# Patient Record
Sex: MALE | Race: WHITE | NOT HISPANIC OR LATINO | Employment: FULL TIME | URBAN - METROPOLITAN AREA
[De-identification: names, ages, dates, MRNs, and addresses within clinical notes are randomized per-mention and may not be internally consistent; named-entity substitution may affect disease eponyms.]

---

## 2023-03-28 ENCOUNTER — TELEPHONE (OUTPATIENT)
Dept: SURGERY | Facility: CLINIC | Age: 38
End: 2023-03-28

## 2023-03-28 NOTE — TELEPHONE ENCOUNTER
[facility]  Ref # [id number]  Provider npi network  Ins active as of 11/29/2022 no future term date  No ref req  facility npi in [facility]

## 2023-03-31 ENCOUNTER — OFFICE VISIT (OUTPATIENT)
Dept: LAB | Facility: HOSPITAL | Age: 38
End: 2023-03-31

## 2023-03-31 ENCOUNTER — APPOINTMENT (OUTPATIENT)
Dept: LAB | Facility: HOSPITAL | Age: 38
End: 2023-03-31

## 2023-03-31 ENCOUNTER — OFFICE VISIT (OUTPATIENT)
Dept: SURGERY | Facility: CLINIC | Age: 38
End: 2023-03-31

## 2023-03-31 VITALS
TEMPERATURE: 97.8 F | RESPIRATION RATE: 16 BRPM | OXYGEN SATURATION: 97 % | HEART RATE: 67 BPM | DIASTOLIC BLOOD PRESSURE: 70 MMHG | SYSTOLIC BLOOD PRESSURE: 122 MMHG | BODY MASS INDEX: 19.67 KG/M2 | HEIGHT: 69 IN | WEIGHT: 132.8 LBS

## 2023-03-31 DIAGNOSIS — K40.90 LEFT INGUINAL HERNIA: ICD-10-CM

## 2023-03-31 DIAGNOSIS — K40.90 LEFT INGUINAL HERNIA: Primary | ICD-10-CM

## 2023-03-31 LAB
ANION GAP SERPL CALCULATED.3IONS-SCNC: 5 MMOL/L (ref 4–13)
ATRIAL RATE: 55 BPM
ATRIAL RATE: 59 BPM
BASOPHILS # BLD AUTO: 0.03 THOUSANDS/ÂΜL (ref 0–0.1)
BASOPHILS NFR BLD AUTO: 0 % (ref 0–1)
BUN SERPL-MCNC: 13 MG/DL (ref 5–25)
CALCIUM SERPL-MCNC: 8.3 MG/DL (ref 8.4–10.2)
CHLORIDE SERPL-SCNC: 107 MMOL/L (ref 96–108)
CO2 SERPL-SCNC: 27 MMOL/L (ref 21–32)
CREAT SERPL-MCNC: 0.93 MG/DL (ref 0.6–1.3)
EOSINOPHIL # BLD AUTO: 0.2 THOUSAND/ÂΜL (ref 0–0.61)
EOSINOPHIL NFR BLD AUTO: 3 % (ref 0–6)
ERYTHROCYTE [DISTWIDTH] IN BLOOD BY AUTOMATED COUNT: 12.4 % (ref 11.6–15.1)
GFR SERPL CREATININE-BSD FRML MDRD: 103 ML/MIN/1.73SQ M
GLUCOSE P FAST SERPL-MCNC: 92 MG/DL (ref 65–99)
HCT VFR BLD AUTO: 39 % (ref 36.5–49.3)
HGB BLD-MCNC: 13.2 G/DL (ref 12–17)
IMM GRANULOCYTES # BLD AUTO: 0.02 THOUSAND/UL (ref 0–0.2)
IMM GRANULOCYTES NFR BLD AUTO: 0 % (ref 0–2)
LYMPHOCYTES # BLD AUTO: 1.61 THOUSANDS/ÂΜL (ref 0.6–4.47)
LYMPHOCYTES NFR BLD AUTO: 23 % (ref 14–44)
MCH RBC QN AUTO: 30.7 PG (ref 26.8–34.3)
MCHC RBC AUTO-ENTMCNC: 33.8 G/DL (ref 31.4–37.4)
MCV RBC AUTO: 91 FL (ref 82–98)
MONOCYTES # BLD AUTO: 0.55 THOUSAND/ÂΜL (ref 0.17–1.22)
MONOCYTES NFR BLD AUTO: 8 % (ref 4–12)
NEUTROPHILS # BLD AUTO: 4.57 THOUSANDS/ÂΜL (ref 1.85–7.62)
NEUTS SEG NFR BLD AUTO: 66 % (ref 43–75)
NRBC BLD AUTO-RTO: 0 /100 WBCS
P AXIS: 59 DEGREES
P AXIS: 63 DEGREES
PLATELET # BLD AUTO: 230 THOUSANDS/UL (ref 149–390)
PMV BLD AUTO: 9.6 FL (ref 8.9–12.7)
POTASSIUM SERPL-SCNC: 3.9 MMOL/L (ref 3.5–5.3)
PR INTERVAL: 192 MS
PR INTERVAL: 196 MS
QRS AXIS: 58 DEGREES
QRS AXIS: 63 DEGREES
QRSD INTERVAL: 96 MS
QRSD INTERVAL: 98 MS
QT INTERVAL: 420 MS
QT INTERVAL: 422 MS
QTC INTERVAL: 401 MS
QTC INTERVAL: 417 MS
RBC # BLD AUTO: 4.3 MILLION/UL (ref 3.88–5.62)
SODIUM SERPL-SCNC: 139 MMOL/L (ref 135–147)
T WAVE AXIS: 59 DEGREES
T WAVE AXIS: 64 DEGREES
VENTRICULAR RATE: 55 BPM
VENTRICULAR RATE: 59 BPM
WBC # BLD AUTO: 6.98 THOUSAND/UL (ref 4.31–10.16)

## 2023-03-31 RX ORDER — BUPRENORPHINE AND NALOXONE 8; 2 MG/1; MG/1
FILM, SOLUBLE BUCCAL; SUBLINGUAL DAILY
COMMUNITY

## 2023-03-31 RX ORDER — CHLORHEXIDINE GLUCONATE 4 G/100ML
SOLUTION TOPICAL DAILY PRN
OUTPATIENT
Start: 2023-03-31

## 2023-03-31 NOTE — PROGRESS NOTES
Assessment/Plan:  78-year-old male with reducible left inguinal hernia  -Patient presents due to left groin pain and bulge  -Has had a left inguinal hernia for over 10 years and is slowly gotten larger  -Causes him pain with straining and when the hernia is very distended  -States he is able to push the hernia back in  -On exam there is a large reducible left inguinal hernia most likely containing bowel, no obvious inguinal hernia on the right  -BMP and LFTs from 3/30/2023 reviewed, this is in [facility]  -Hemoglobin A1c from 3/30/2023 reviewed, noted to be 5 6, this is in [facility]  -Primary care physician note from 3/24/2023 reviewed, this is in [facility]  -Will plan for laparoscopic left inguinal hernia repair with mesh, possible open, possible bilateral  -CBC ordered  -BMP ordered, will repeat this as last potassium level was noted to be 5 5  -EKG ordered    A visual was used to display the anatomy and the proposed incision, procedure, steps, and mesh placement  The risks were explained at length and outlined, not limited to bleeding, infection, recurrence, pain, testicular loss, and damage to other structures  The planned approximately one hour procedure was discussed along with 1 - 2 week recovery, resolution of pain and swelling timeline, and 4 weeks of light duty without lifting  Questions were answered to satisfaction and consent was signed  1  Left inguinal hernia  -     Case request operating room: REPAIR HERNIA INGUINAL, LAPAROSCOPIC, Possible Open, Possible Bilateral; Standing  -     CBC and differential; Future  -     Basic metabolic panel; Future  -     EKG 12 lead; Future  -     Case request operating room: REPAIR HERNIA INGUINAL, LAPAROSCOPIC, Possible Open, Possible Bilateral               Subjective:      Patient ID: Bharath Bowers is a 44 y o  male  Triage Notes:    Patient is a 78-year-old male who presents the office for evaluation of left groin pain and bulge    Patient states he has a longstanding history of left inguinal hernia  It started out small but over the years has gotten larger and it is causing him discomfort  States that when he strains he has pain in his left groin  Describes the pain as a pressure or tearing  He is able to push the hernia and most times  He is passing flatus and having normal bowel function  The following portions of the patient's history were reviewed and updated as appropriate:   He  has no past medical history on file  He   Patient Active Problem List    Diagnosis Date Noted   • Left inguinal hernia 03/31/2023     He  has no past surgical history on file  His family history includes Cancer in his mother; Heart disease in his father; Stroke in his father  He  reports that he has quit smoking  His smoking use included cigarettes  He smoked an average of 1 pack per day  He has never been exposed to tobacco smoke  He has never used smokeless tobacco  He reports current alcohol use  He reports current drug use  Drug: Marijuana  Current Outpatient Medications on File Prior to Visit   Medication Sig   • buprenorphine-naloxone (Suboxone) 8-2 mg Place under the tongue daily     No current facility-administered medications on file prior to visit  He has No Known Allergies       Review of Systems   Constitutional: Negative for chills, fatigue and fever  HENT: Negative for congestion, hearing loss, rhinorrhea and sore throat  Eyes: Negative for pain and discharge  Respiratory: Negative for cough, chest tightness and shortness of breath  Cardiovascular: Negative for chest pain and palpitations  Gastrointestinal: Negative for abdominal pain, constipation, diarrhea, nausea and vomiting  Positive left groin pain and bulge   Endocrine: Negative for cold intolerance and heat intolerance  Genitourinary: Negative for difficulty urinating and dysuria  Musculoskeletal: Negative for back pain and neck pain     Skin: Negative for "color change and rash  Allergic/Immunologic: Negative for environmental allergies and food allergies  Neurological: Negative for seizures and headaches  Hematological: Negative for adenopathy  Does not bruise/bleed easily  Psychiatric/Behavioral: Negative for confusion and hallucinations  Objective:      /70 (BP Location: Left arm, Patient Position: Sitting, Cuff Size: Standard)   Pulse 67   Temp 97 8 °F (36 6 °C) (Temporal)   Resp 16   Ht 5' 9\" (1 753 m)   Wt 60 2 kg (132 lb 12 8 oz)   SpO2 97%   BMI 19 61 kg/m²     Below is the patient's most recent value for Albumin, ALT, AST, BUN, Calcium, Chloride, Cholesterol, CO2, Creatinine, GFR, Glucose, HDL, Hematocrit, Hemoglobin, Hemoglobin A1C, LDL, Magnesium, Phosphorus, Platelets, Potassium, PSA, Sodium, Triglycerides, and WBC  Lab Results   Component Value Date    HCT 39 0 03/31/2023    HGB 13 2 03/31/2023    HGBA1C 5 6 03/30/2023     03/31/2023    WBC 6 98 03/31/2023     Note: for a comprehensive list of the patient's lab results, access the Results Review activity  Physical Exam  Constitutional:       Appearance: Normal appearance  HENT:      Head: Normocephalic and atraumatic  Nose: Nose normal    Eyes:      General: No scleral icterus  Conjunctiva/sclera: Conjunctivae normal    Cardiovascular:      Rate and Rhythm: Normal rate and regular rhythm  Heart sounds: Normal heart sounds  Pulmonary:      Effort: Pulmonary effort is normal       Breath sounds: Normal breath sounds  Abdominal:      General: There is no distension  Palpations: Abdomen is soft  Tenderness: There is no abdominal tenderness  Comments: There is a large reducible left inguinal hernia, no obvious inguinal hernia on the right   Musculoskeletal:         General: No signs of injury  Skin:     General: Skin is warm  Coloration: Skin is not jaundiced  Neurological:      General: No focal deficit present        Mental " Status: He is alert and oriented to person, place, and time     Psychiatric:         Mood and Affect: Mood normal          Behavior: Behavior normal

## 2023-04-27 ENCOUNTER — OFFICE VISIT (OUTPATIENT)
Dept: SURGERY | Facility: CLINIC | Age: 38
End: 2023-04-27

## 2023-04-27 VITALS
HEIGHT: 69 IN | BODY MASS INDEX: 18.82 KG/M2 | DIASTOLIC BLOOD PRESSURE: 80 MMHG | WEIGHT: 127.1 LBS | TEMPERATURE: 98 F | SYSTOLIC BLOOD PRESSURE: 132 MMHG | OXYGEN SATURATION: 100 % | HEART RATE: 80 BPM

## 2023-04-27 DIAGNOSIS — S30.1XXA ABDOMINAL WALL SEROMA, INITIAL ENCOUNTER: ICD-10-CM

## 2023-04-27 DIAGNOSIS — K40.90 LEFT INGUINAL HERNIA: Primary | ICD-10-CM

## 2023-04-27 NOTE — PROGRESS NOTES
Assessment/Plan:  18-year-old male status post laparoscopic left inguinal hernia repair with mesh on 4/12/2023  -Patient is tolerating a diet well and having normal bowel function  -Does describe some swelling in his left groin/testicle area, this has been improving  -Denies any groin pain  -Laparoscopic incisions healing well without erythema induration or drainage, no signs of hernia recurrence in the left groin, patient does have a moderate-sized left groin seroma  -Discussed with patient that the seroma should slowly decrease in size over time, if he has any questions or concerns he should call the office  -Patient is okay to return to work with restrictions of no heavy lifting greater than 15 to 20 pounds on 4/28/2023  -Patient is okay to return to work with no restrictions on 5/9/2023  -Follow-up in office as needed     1  Left inguinal hernia    2  Abdominal wall seroma, initial encounter               Subjective:      Patient ID: Anne Carbajal is a 44 y o  male  Triage Notes:    Patient is a 18-year-old male who presents the office for evaluation status post laparoscopic left inguinal hernia repair with mesh  He has been tolerating a diet well and having normal bowel function  He denies any abdominal or groin pain  He does note that he has some swelling in his left groin to the level of his testicle  This caused him some discomfort initially but this has improved  He states the swelling has been going down slowly  The following portions of the patient's history were reviewed and updated as appropriate: allergies, current medications, past family history, past medical history, past social history, past surgical history and problem list     Review of Systems   Constitutional: Negative for chills, fatigue and fever  HENT: Negative for congestion, hearing loss, rhinorrhea and sore throat  Eyes: Negative for pain and discharge     Respiratory: Negative for cough, chest tightness and shortness of "breath  Cardiovascular: Negative for chest pain and palpitations  Gastrointestinal: Negative for abdominal pain, constipation, diarrhea, nausea and vomiting  Positive left groin seroma   Endocrine: Negative for cold intolerance and heat intolerance  Genitourinary: Negative for difficulty urinating and dysuria  Musculoskeletal: Negative for back pain and neck pain  Skin: Negative for color change and rash  Allergic/Immunologic: Negative for environmental allergies and food allergies  Neurological: Negative for seizures and headaches  Hematological: Negative for adenopathy  Does not bruise/bleed easily  Psychiatric/Behavioral: Negative for confusion and hallucinations  Objective:      /80 (BP Location: Right arm, Patient Position: Sitting, Cuff Size: Standard)   Pulse 80   Temp 98 °F (36 7 °C)   Ht 5' 9\" (1 753 m)   Wt 57 7 kg (127 lb 1 6 oz)   SpO2 100%   BMI 18 77 kg/m²     Below is the patient's most recent value for Albumin, ALT, AST, BUN, Calcium, Chloride, Cholesterol, CO2, Creatinine, GFR, Glucose, HDL, Hematocrit, Hemoglobin, Hemoglobin A1C, LDL, Magnesium, Phosphorus, Platelets, Potassium, PSA, Sodium, Triglycerides, and WBC  Lab Results   Component Value Date    BUN 13 03/31/2023    CALCIUM 8 3 (L) 03/31/2023     03/31/2023    CO2 27 03/31/2023    CREATININE 0 93 03/31/2023    HCT 39 0 03/31/2023    HGB 13 2 03/31/2023    HGBA1C 5 6 03/30/2023     03/31/2023    K 3 9 03/31/2023    WBC 6 98 03/31/2023     Note: for a comprehensive list of the patient's lab results, access the Results Review activity  Physical Exam  Constitutional:       Appearance: Normal appearance  HENT:      Head: Normocephalic and atraumatic  Nose: Nose normal    Eyes:      General: No scleral icterus  Conjunctiva/sclera: Conjunctivae normal    Cardiovascular:      Rate and Rhythm: Normal rate     Pulmonary:      Effort: Pulmonary effort is normal    Abdominal:     " General: There is no distension  Palpations: Abdomen is soft  Tenderness: There is no abdominal tenderness  Comments: Laparoscopic incisions healing well without erythema induration or drainage, no signs of hernia recurrence in the left groin, patient does have a moderate-sized left groin seroma   Musculoskeletal:         General: No signs of injury  Skin:     General: Skin is warm  Coloration: Skin is not jaundiced  Neurological:      General: No focal deficit present  Mental Status: He is alert and oriented to person, place, and time     Psychiatric:         Mood and Affect: Mood normal          Behavior: Behavior normal